# Patient Record
Sex: FEMALE | Race: WHITE | HISPANIC OR LATINO | Employment: FULL TIME | URBAN - METROPOLITAN AREA
[De-identification: names, ages, dates, MRNs, and addresses within clinical notes are randomized per-mention and may not be internally consistent; named-entity substitution may affect disease eponyms.]

---

## 2021-06-27 ENCOUNTER — APPOINTMENT (OUTPATIENT)
Dept: RADIOLOGY | Facility: MEDICAL CENTER | Age: 26
End: 2021-06-27
Attending: EMERGENCY MEDICINE
Payer: COMMERCIAL

## 2021-06-27 ENCOUNTER — NON-PROVIDER VISIT (OUTPATIENT)
Dept: OCCUPATIONAL MEDICINE | Facility: CLINIC | Age: 26
End: 2021-06-27

## 2021-06-27 ENCOUNTER — HOSPITAL ENCOUNTER (EMERGENCY)
Facility: MEDICAL CENTER | Age: 26
End: 2021-06-27
Attending: EMERGENCY MEDICINE
Payer: COMMERCIAL

## 2021-06-27 VITALS
RESPIRATION RATE: 18 BRPM | WEIGHT: 220 LBS | OXYGEN SATURATION: 95 % | HEIGHT: 64 IN | DIASTOLIC BLOOD PRESSURE: 83 MMHG | HEART RATE: 86 BPM | SYSTOLIC BLOOD PRESSURE: 115 MMHG | TEMPERATURE: 97.9 F | BODY MASS INDEX: 37.56 KG/M2

## 2021-06-27 DIAGNOSIS — Z02.83 ENCOUNTER FOR DRUG SCREENING: ICD-10-CM

## 2021-06-27 DIAGNOSIS — S86.912A KNEE STRAIN, LEFT, INITIAL ENCOUNTER: ICD-10-CM

## 2021-06-27 DIAGNOSIS — S56.912A FOREARM STRAIN, LEFT, INITIAL ENCOUNTER: ICD-10-CM

## 2021-06-27 DIAGNOSIS — S53.402A SPRAIN OF LEFT ELBOW, INITIAL ENCOUNTER: ICD-10-CM

## 2021-06-27 LAB
AMP AMPHETAMINE: NORMAL
BAR BARBITURATES: NORMAL
BREATH ALCOHOL COMMENT: NORMAL
BZO BENZODIAZEPINES: NORMAL
COC COCAINE: NORMAL
INT CON NEG: NORMAL
INT CON POS: NORMAL
MDMA ECSTASY: NORMAL
MET METHAMPHETAMINES: NORMAL
MTD METHADONE: NORMAL
OPI OPIATES: NORMAL
OXY OXYCODONE: NORMAL
PCP PHENCYCLIDINE: NORMAL
POC BREATHALIZER: 0 PERCENT (ref 0–0.01)
POC URINE DRUG SCREEN OCDRS: NORMAL
THC: NORMAL

## 2021-06-27 PROCEDURE — 8899 PR URINE 11 PANEL - AFTER HOURS: Performed by: PREVENTIVE MEDICINE

## 2021-06-27 PROCEDURE — 73090 X-RAY EXAM OF FOREARM: CPT | Mod: LT

## 2021-06-27 PROCEDURE — 73564 X-RAY EXAM KNEE 4 OR MORE: CPT | Mod: LT

## 2021-06-27 PROCEDURE — 73080 X-RAY EXAM OF ELBOW: CPT | Mod: LT

## 2021-06-27 PROCEDURE — 99283 EMERGENCY DEPT VISIT LOW MDM: CPT

## 2021-06-27 PROCEDURE — 82075 ASSAY OF BREATH ETHANOL: CPT | Performed by: PREVENTIVE MEDICINE

## 2021-06-27 PROCEDURE — 80305 DRUG TEST PRSMV DIR OPT OBS: CPT | Performed by: PREVENTIVE MEDICINE

## 2021-06-27 ASSESSMENT — LIFESTYLE VARIABLES
HAVE PEOPLE ANNOYED YOU BY CRITICIZING YOUR DRINKING: NO
TOTAL SCORE: 0
EVER HAD A DRINK FIRST THING IN THE MORNING TO STEADY YOUR NERVES TO GET RID OF A HANGOVER: NO
TOTAL SCORE: 0
HAVE YOU EVER FELT YOU SHOULD CUT DOWN ON YOUR DRINKING: NO
TOTAL SCORE: 0
EVER FELT BAD OR GUILTY ABOUT YOUR DRINKING: NO
CONSUMPTION TOTAL: INCOMPLETE
DO YOU DRINK ALCOHOL: YES

## 2021-06-27 ASSESSMENT — ENCOUNTER SYMPTOMS
FALLS: 1
VOMITING: 0
FEVER: 0
HEADACHES: 0

## 2021-06-27 ASSESSMENT — PAIN DESCRIPTION - PAIN TYPE: TYPE: ACUTE PAIN
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
## 2021-06-29 ENCOUNTER — OCCUPATIONAL MEDICINE (OUTPATIENT)
Dept: URGENT CARE | Facility: CLINIC | Age: 26
End: 2021-06-29
Payer: COMMERCIAL

## 2021-06-29 VITALS
WEIGHT: 205 LBS | SYSTOLIC BLOOD PRESSURE: 128 MMHG | RESPIRATION RATE: 16 BRPM | BODY MASS INDEX: 35 KG/M2 | OXYGEN SATURATION: 98 % | TEMPERATURE: 98.4 F | HEART RATE: 82 BPM | DIASTOLIC BLOOD PRESSURE: 76 MMHG | HEIGHT: 64 IN

## 2021-06-29 DIAGNOSIS — S56.912A STRAIN OF LEFT FOREARM, INITIAL ENCOUNTER: ICD-10-CM

## 2021-06-29 DIAGNOSIS — S80.02XA CONTUSION OF LEFT KNEE, INITIAL ENCOUNTER: ICD-10-CM

## 2021-06-29 DIAGNOSIS — S53.402A SPRAIN OF LEFT ELBOW, INITIAL ENCOUNTER: ICD-10-CM

## 2021-06-29 PROCEDURE — 99204 OFFICE O/P NEW MOD 45 MIN: CPT | Performed by: PHYSICIAN ASSISTANT

## 2021-06-29 ASSESSMENT — ENCOUNTER SYMPTOMS
FOCAL WEAKNESS: 0
SENSORY CHANGE: 0
TINGLING: 1
FALLS: 1
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
## 2021-06-29 NOTE — PATIENT INSTRUCTIONS
Muscle Strain  A muscle strain is an injury that happens when a muscle is stretched longer than normal. This can happen during a fall, sports, or lifting. This can tear some muscle fibers. Usually, recovery from muscle strain takes 1-2 weeks. Complete healing normally takes 5-6 weeks.  This condition is first treated with PRICE therapy. This involves:  · Protecting your muscle from being injured again.  · Resting your injured muscle.  · Icing your injured muscle.  · Applying pressure (compression) to your injured muscle. This may be done with a splint or elastic bandage.  · Raising (elevating) your injured muscle.  Your doctor may also recommend medicine for pain.  Follow these instructions at home:  If you have a splint:  · Wear the splint as told by your doctor. Take it off only as told by your doctor.  · Loosen the splint if your fingers or toes tingle, get numb, or turn cold and blue.  · Keep the splint clean.  · If the splint is not waterproof:  ? Do not let it get wet.  ? Cover it with a watertight covering when you take a bath or a shower.  Managing pain, stiffness, and swelling    · If directed, put ice on your injured area.  ? If you have a removable splint, take it off as told by your doctor.  ? Put ice in a plastic bag.  ? Place a towel between your skin and the bag.  ? Leave the ice on for 20 minutes, 2-3 times a day.  · Move your fingers or toes often. This helps to avoid stiffness and lessen swelling.  · Raise your injured area above the level of your heart while you are sitting or lying down.  · Wear an elastic bandage as told by your doctor. Make sure it is not too tight.  General instructions  · Take over-the-counter and prescription medicines only as told by your doctor.  · Limit your activity. Rest your injured muscle as told by your doctor. Your doctor may say that gentle movements are okay.  · If physical therapy was prescribed, do exercises as told by your doctor.  · Do not put pressure on any  part of the splint until it is fully hardened. This may take many hours.  · Do not use any products that contain nicotine or tobacco, such as cigarettes and e-cigarettes. These can delay bone healing. If you need help quitting, ask your doctor.  · Warm up before you exercise. This helps to prevent more muscle strains.  · Ask your doctor when it is safe to drive if you have a splint.  · Keep all follow-up visits as told by your doctor. This is important.  Contact a doctor if:  · You have more pain or swelling in your injured area.  Get help right away if:  · You have any of these problems in your injured area:  ? You have numbness.  ? You have tingling.  ? You lose a lot of strength.  Summary  · A muscle strain is an injury that happens when a muscle is stretched longer than normal.  · This condition is first treated with PRICE therapy. This includes protecting, resting, icing, adding pressure, and raising your injury.  · Limit your activity. Rest your injured muscle as told by your doctor. Your doctor may say that gentle movements are okay.  · Warm up before you exercise. This helps to prevent more muscle strains.  This information is not intended to replace advice given to you by your health care provider. Make sure you discuss any questions you have with your health care provider.  Document Released: 09/26/2009 Document Revised: 02/13/2020 Document Reviewed: 01/24/2018  Elsevier Patient Education © 2020 Else[employer] Inc[employer]  Contusion  A contusion is a deep bruise. This is a result of an injury that causes bleeding under the skin. Symptoms of bruising include pain, swelling, and discolored skin. The skin may turn blue, purple, or yellow.  Follow these instructions at home:  Managing pain, stiffness, and swelling  You may use RICE. This stands for:  · Resting.  · Icing.  · Compression, or putting pressure.  · Elevating, or raising the injured area.  To follow this method, do these actions:  · Rest the injured area.  · If  told, put ice on the injured area.  ? Put ice in a plastic bag.  ? Place a towel between your skin and the bag.  ? Leave the ice on for 20 minutes, 2-3 times per day.  · If told, put light pressure (compression) on the injured area using an elastic bandage. Make sure the bandage is not too tight. If the area tingles or becomes numb, remove it and put it back on as told by your doctor.  · If possible, raise (elevate) the injured area above the level of your heart while you are sitting or lying down.    General instructions  · Take over-the-counter and prescription medicines only as told by your doctor.  · Keep all follow-up visits as told by your doctor. This is important.  Contact a doctor if:  · Your symptoms do not get better after several days of treatment.  · Your symptoms get worse.  · You have trouble moving the injured area.  Get help right away if:  · You have very bad pain.  · You have a loss of feeling (numbness) in a hand or foot.  · Your hand or foot turns pale or cold.  Summary  · A contusion is a deep bruise. This is a result of an injury that causes bleeding under the skin.  · Symptoms of bruising include pain, swelling, and discolored skin. The skin may turn blue, purple, or yellow.  · This condition is treated with rest, ice, compression, and elevation. This is also called RICE. You may be given over-the-counter medicines for pain.  · Contact a doctor if you do not feel better, or you feel worse. Get help right away if you have very bad pain, have lost feeling in a hand or foot, or the area turns pale or cold.  This information is not intended to replace advice given to you by your health care provider. Make sure you discuss any questions you have with your health care provider.  Document Released: 06/05/2009 Document Revised: 08/09/2019 Document Reviewed: 08/09/2019  Elsevier Patient Education © 2020 Elsevier Inc.

## 2021-06-29 NOTE — PROGRESS NOTES
"Subjective:     Ambar Schulte  is a 26 y.o. female who presents for Arm Injury (Lt arm injury ), Elbow Injury (LT elbow injury ), and Knee Pain (LT knee injury)  Patient identity confirmed using two patient identifiers of last name and date of birth.    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 She had immediate pain in the left elbow, left forearm and left knee. She was seen in the ED at [facility]. Xrays of left elbow, left forearm and left knee were negative for fracture. She was placed in a sling and instructed to use tylenol or ibuprofen OTC for pain PRN which she has been using and is somewhat helpful.. She reports pain is slightly improved but still present. Denies neck pain. Denies numbness. Does admit to some \"tingling\" along the left radial wrist region when moving her thumb at times. Patient is right hand dominant.   Arm Injury    Knee Pain          Review of Systems   Musculoskeletal: Positive for falls and joint pain.   Neurological: Positive for tingling. Negative for sensory change and focal weakness.   All other systems reviewed and are negative.    Allergies   Allergen Reactions   • Pcn [Penicillins]    • Sulfa Drugs      Past medical history, family history, surgical history and social history are reviewed and updated in the record today.     Objective:   /76   Pulse 82   Temp 36.9 °C (98.4 °F) (Temporal)   Resp 16   Ht 1.626 m (5' 4\")   Wt 93 kg (205 lb)   SpO2 98%   BMI 35.19 kg/m²   Physical Exam  Vitals reviewed.   Constitutional:       Appearance: She is well-developed.   HENT:      Head: Normocephalic and atraumatic.      Right Ear: External ear normal.      Left Ear: External ear normal.   Eyes:      Extraocular Movements: Extraocular " movements intact.      Conjunctiva/sclera: Conjunctivae normal.      Pupils: Pupils are equal, round, and reactive to light.   Cardiovascular:      Rate and Rhythm: Normal rate and regular rhythm.      Pulses:           Radial pulses are 2+ on the left side.        Dorsalis pedis pulses are 2+ on the left side.   Pulmonary:      Effort: Pulmonary effort is normal.   Musculoskeletal:      Left elbow: Normal range of motion. Tenderness present.      Left forearm: Tenderness present.        Arms:       Cervical back: Normal range of motion and neck supple.      Left knee: Ecchymosis present. No swelling or deformity. Normal range of motion. Tenderness present.        Legs:    Lymphadenopathy:      Cervical: No cervical adenopathy.   Skin:     General: Skin is warm and dry.      Findings: No rash.   Neurological:      Mental Status: She is alert and oriented to person, place, and time.      Cranial Nerves: Cranial nerves are intact.      Sensory: Sensation is intact.      Motor: Motor function is intact.      Coordination: Coordination is intact.   Psychiatric:         Attention and Perception: Attention normal.         Mood and Affect: Mood normal.         Speech: Speech normal.         Behavior: Behavior normal.         Thought Content: Thought content normal.         Judgment: Judgment normal.       C-spine: no tenderness, full ROM  Left shoulder: mild tender to palpation. No limited ROM  Left elbow: no ecchymosis, soft tissue swelling or deformity noted. TTP along medial epicondyle. Mild pain medial with supination  Left forearm: no ecchymosis, soft tissue swelling or deformity noted. TTP along radial aspect. Pain along radial aspect with pronation, supination and flexion/extension of the wrist.   Left wrist: no soft tissue swelling, ecchymosis or deformity noted. Nontender distal radius or ulna. Pain in the forearm with flexion/extension as noted above.   Left hand: WNL  Distal NV intact  Left knee: ecchymosis over  the patella. TTP. Full ROM without limitation. No ligamentous laxity noted. Negative. Lachman, Negative Alisha  Assessment/Plan:   1. Sprain of left elbow, initial encounter    2. Strain of left forearm, initial encounter    3. Contusion of left knee, initial encounter  May continue sling PRN. Encouraged to come out of sling several times per day and work on elbow ROM to avoid joint stiffness, come out as sling as soon as able. May use Tylenol or ibuprofen over-the-counter as needed for pain or fever not to exceed recommended daily dose.  Work restrictions as noted on NV D-39 RTC : 1 week for work comp f/u and re-evaluation.     NV D-39 and C-4 completed.     Previous xray of left elbow, left forearm and left knee from DOS 6/27/21 are reviewed by myself as part of today's visit. Previous ED visit noted from same DOS reviewed by myself today.       Differential diagnosis, natural history, supportive care, and indications for immediate follow-up discussed.  Red flag warning symptoms and strict ER/follow-up precautions given.  The patient demonstrated a good understanding and agreed with the treatment plan.    Upon entering exam room I ensured patient was wearing a mask.  This provider wore appropriate PPE throughout entire visit.  Patient wore mask entire visit except for a brief period while examining oropharynx.    Please note that this note was created using voice recognition speech to text software. Every effort has been made to correct obvious errors.  However, I expect there are errors of grammar and possibly context that were not discovered prior to finalizing the note  INDER Moran PA-C

## 2021-07-06 ENCOUNTER — OCCUPATIONAL MEDICINE (OUTPATIENT)
Dept: URGENT CARE | Facility: CLINIC | Age: 26
End: 2021-07-06
Payer: COMMERCIAL

## 2021-07-06 VITALS
WEIGHT: 205 LBS | BODY MASS INDEX: 35 KG/M2 | OXYGEN SATURATION: 97 % | HEIGHT: 64 IN | TEMPERATURE: 97.4 F | RESPIRATION RATE: 14 BRPM | DIASTOLIC BLOOD PRESSURE: 88 MMHG | SYSTOLIC BLOOD PRESSURE: 110 MMHG | HEART RATE: 64 BPM

## 2021-07-06 DIAGNOSIS — S56.912D STRAIN OF LEFT FOREARM, SUBSEQUENT ENCOUNTER: ICD-10-CM

## 2021-07-06 DIAGNOSIS — S53.402D SPRAIN OF LEFT ELBOW, SUBSEQUENT ENCOUNTER: ICD-10-CM

## 2021-07-06 DIAGNOSIS — S80.02XD CONTUSION OF LEFT KNEE, SUBSEQUENT ENCOUNTER: ICD-10-CM

## 2021-07-06 PROCEDURE — 99213 OFFICE O/P EST LOW 20 MIN: CPT | Performed by: NURSE PRACTITIONER

## 2021-07-06 NOTE — LETTER
[facility]  [address], [address] - [place] [address]  Phone:  [phone] - Fax:  [phone]   [facility] Progress Report and Disability Certification  Date of Service: 7/6/2021   No Show:  No  Date / Time of Next Visit:     Claim Information   Patient Name: Ambar Schulte  Claim Number:     Employer: [employer]  Date of Injury: 6/27/2021     Insurer / TPA: [employer]  ID / SSN:     Occupation: Security  Diagnosis: Diagnoses of Sprain of left elbow, subsequent encounter, Strain of left forearm, subsequent encounter, and Contusion of left knee, subsequent encounter were pertinent to this visit.    Medical Information   Related to Industrial Injury? Yes    Subjective Complaints:  DOI 6/27/21: Patient presents to urgent care for a work comp follow-up.  Patient sustained injuries to her left knee and right arm.  She was initially seen in the emergency department, x-rays were performed.  Diagnosed with an elbow contusion, forearm strain, and knee contusion. She was placed in a sling, using as needed.  She has also been taking Tylenol and ibuprofen as needed.  She has been on work restrictions, tolerated well.  Today she reports improvement. She is no longer using a sling, her forearm and elbow are without pain. She has some slight pain to her left knee, mostly with palpation. She has been tolerating work restrictions.    Objective Findings: A/Ox4. NAD.  No midline spinal tenderness.  Left elbow: Normal in appearance, no tenderness throughout, full range of motion, strength 5/5.  Left forearm: Normal in appearance, nontender throughout, full range of motion, strength 5/5, neurovascular is intact.  Left wrist and shoulder normal appearance, full range of motion, are nontender.  Left knee: Mild swelling with ecchymosis to patella, associated tenderness present, knee has full range of motion, neurovascular is intact, no obvious laxity, strength  5/5.  Gait is stable.   Pre-Existing Condition(s): Denies   Assessment:   Condition Improved    Status: Additional Care Required  Permanent Disability:No    Plan: Medication  Comments:OTC motrin, RICE, trial full duty, RTC in 4 days.     Diagnostics: X-ray  Comments:Negative    Comments:       Disability Information   Status: Released to Full Duty    From:     Through:   Restrictions are:     Physical Restrictions   Sitting:    Standing:    Stooping:    Bending:      Squatting:    Walking:    Climbing:    Pushing:      Pulling:    Other:    Reaching Above Shoulder (L):   Reaching Above Shoulder (R):       Reaching Below Shoulder (L):    Reaching Below Shoulder (R):      Not to exceed Weight Limits   Carrying(hrs):   Weight Limit(lb):   Lifting(hrs):   Weight  Limit(lb):     Comments:      Repetitive Actions   Hands: i.e. Fine Manipulations from Grasping:     Feet: i.e. Operating Foot Controls:     Driving / Operate Machinery:     Provider Name:   ANDREI Kimble Physician Signature:  Physician Name:     Clinic Name / [facility] [place] [address]  [facility] [address] Clinic Phone Number: Dept: [phone]   Appointment Time: 8:00 Am Visit Start Time: 9:06 AM   Check-In Time:  9:04 Am Visit Discharge Time:  0932   Original-Treating Physician or Chiropractor    Page 2-Insurer/TPA    Page 3-Employer    Page 4-Employee

## 2021-07-06 NOTE — PROGRESS NOTES
Chief Complaint   Patient presents with   • Arm Injury     WC Fv, LT arm injury, Pt states she is improving.        HISTORY OF PRESENT ILLNESS: Patient is a pleasant 26 y.o. female who presents to urgent care today with a work comp follow up. DOI 6/27/21: Patient presents to urgent care for a work comp follow-up.  Patient sustained injuries to her left knee and right arm.  She was initially seen in the emergency department, x-rays were performed.  Diagnosed with an elbow contusion, forearm strain, and knee contusion. She was placed in a sling, using as needed.  She has also been taking Tylenol and ibuprofen as needed.  She has been on work restrictions, tolerated well.  Today she reports improvement. She is no longer using a sling, her forearm and elbow are without pain. She has some slight pain to her left knee, mostly with palpation. She has been tolerating work restrictions.         PMH: No pertinent past medical history to this problem  MEDS: Medications were reviewed in Epic  ALLERGIES: Allergies were reviewed in Epic  FH: No pertinent family history to this problem      ROS:  Review of Systems   Constitutional: Negative for fever, chills, weight loss, malaise, and fatigue.   HENT: Negative for ear pain, nosebleeds, congestion, sore throat and neck pain.    Eyes: Negative for vision changes.   Neuro: Negative for headache, sensory changes, weakness, seizure, LOC.   Cardiovascular: Negative for chest pain, palpitations, orthopnea and leg swelling.   Respiratory: Negative for cough, sputum production, shortness of breath and wheezing.   Gastrointestinal: Negative for abdominal pain, nausea, vomiting or diarrhea.   Genitourinary: Negative for dysuria, urgency and frequency.  Musculoskeletal: Positive for left arm and leg injury. Negative for neck pain, back pain, myalgias.   Skin: Negative for rash, diaphoresis.     Exam:  /88   Pulse 64   Temp 36.3 °C (97.4 °F) (Temporal)   Resp 14   Ht 1.626 m (5'  "4\")   Wt 93 kg (205 lb)   SpO2 97%   General: well-nourished, well-developed female in NAD  Head: normocephalic, atraumatic  Eyes: PERRLA, no conjunctival injection, acuity grossly intact, lids normal.  Ears: normal shape and symmetry, no tenderness, no discharge. External canals are without any significant edema or erythema. Tympanic membranes are without any inflammation, no effusion. Gross auditory acuity is intact.  Nose: symmetrical without tenderness, no discharge.  Mouth/Throat: reasonable hygiene, no erythema, exudates or tonsillar enlargement.  Neck: no masses, range of motion within normal limits, no tracheal deviation. No obvious thyroid enlargement.   Lymph: no cervical adenopathy. No supraclavicular adenopathy.   Neuro: alert and oriented. Cranial nerves 1-12 grossly intact. No sensory deficit.   Cardiovascular: regular rate and rhythm. No edema.  Pulmonary: no distress. Chest is symmetrical with respiration, no wheezes, crackles, or rhonchi.   Musculoskeletal: no clubbing, appropriate muscle tone.  No midline spinal tenderness.  Left elbow: Normal in appearance, no tenderness throughout, full range of motion, strength 5/5.  Left forearm: Normal in appearance, nontender throughout, full range of motion, strength 5/5, neurovascular is intact.  Left wrist and shoulder normal appearance, full range of motion, are nontender.  Left knee: Mild swelling with ecchymosis to patella, associated tenderness present, knee has full range of motion, neurovascular is intact, no obvious laxity, strength 5/5.  Gait is stable.  Skin: warm, dry, intact, no clubbing, no cyanosis, no rashes.   Psych: appropriate mood, affect, judgement.         Assessment/Plan:  1. Sprain of left elbow, subsequent encounter     2. Strain of left forearm, subsequent encounter     3. Contusion of left knee, subsequent encounter         OTC motrin, RICE, trial full duty, RTC in 4 days for anticipated MMI.   Supportive care, differential " diagnoses, and indications for immediate follow-up discussed with patient.   Pathogenesis of diagnosis discussed including typical length and natural progression.   Instructed to return to clinic or nearest emergency department sooner for any change in condition, further concerns, or worsening of symptoms.  Patient states understanding of the plan of care and discharge instructions.          Please note that this dictation was created using voice recognition software. I have made every reasonable attempt to correct obvious errors, but I expect that there are errors of grammar and possibly content that I did not discover before finalizing the note.      ELEUTERIO Kimble.

## 2021-07-10 ENCOUNTER — OCCUPATIONAL MEDICINE (OUTPATIENT)
Dept: URGENT CARE | Facility: PHYSICIAN GROUP | Age: 26
End: 2021-07-10
Payer: COMMERCIAL

## 2021-07-10 VITALS
HEIGHT: 64 IN | DIASTOLIC BLOOD PRESSURE: 60 MMHG | TEMPERATURE: 98.1 F | WEIGHT: 205 LBS | SYSTOLIC BLOOD PRESSURE: 110 MMHG | HEART RATE: 71 BPM | BODY MASS INDEX: 35 KG/M2 | RESPIRATION RATE: 16 BRPM | OXYGEN SATURATION: 98 %

## 2021-07-10 DIAGNOSIS — S53.402D SPRAIN OF LEFT ELBOW, SUBSEQUENT ENCOUNTER: ICD-10-CM

## 2021-07-10 DIAGNOSIS — S80.02XD CONTUSION OF LEFT KNEE, SUBSEQUENT ENCOUNTER: ICD-10-CM

## 2021-07-10 DIAGNOSIS — S56.912D STRAIN OF LEFT FOREARM, SUBSEQUENT ENCOUNTER: ICD-10-CM

## 2021-07-10 PROCEDURE — 99213 OFFICE O/P EST LOW 20 MIN: CPT | Performed by: NURSE PRACTITIONER

## 2021-07-10 ASSESSMENT — ENCOUNTER SYMPTOMS
SENSORY CHANGE: 0
FOCAL WEAKNESS: 0
TINGLING: 0
FEVER: 0
CHILLS: 0

## 2021-07-10 NOTE — PROGRESS NOTES
Subjective:      Ambar Schulte is a 26 y.o. female who presents with Follow-Up (DOI 06/27/21 injury on left arm and left knee, pt states feels better today )      DOI: 6/27/21. 26 year old female here for follow up on contusion to L. Elbow, left knee and strain of left forearm. Today she reports improvement in all aspects. Tolerating full duty. She states when lifting heavy object she will have some soreness but overall much improved.     HPI  Pcn [penicillins] and Sulfa drugs  No current outpatient medications on file prior to visit.     No current facility-administered medications on file prior to visit.     Social History     Socioeconomic History   • Marital status: Single     Spouse name: Not on file   • Number of children: Not on file   • Years of education: Not on file   • Highest education level: Not on file   Occupational History   • Not on file   Tobacco Use   • Smoking status: Never Smoker   • Smokeless tobacco: Never Used   Vaping Use   • Vaping Use: Never used   Substance and Sexual Activity   • Alcohol use: Not Currently   • Drug use: Not Currently   • Sexual activity: Not on file   Other Topics Concern   • Not on file   Social History Narrative   • Not on file     Social Determinants of Health     Financial Resource Strain:    • Difficulty of Paying Living Expenses:    Food Insecurity:    • Worried About Running Out of Food in the Last Year:    • Ran Out of Food in the Last Year:    Transportation Needs:    • Lack of Transportation (Medical):    • Lack of Transportation (Non-Medical):    Physical Activity:    • Days of Exercise per Week:    • Minutes of Exercise per Session:    Stress:    • Feeling of Stress :    Social Connections:    • Frequency of Communication with Friends and Family:    • Frequency of Social Gatherings with Friends and Family:    • Attends [employer] Services:    • Active Member of Clubs or Organizations:    • Attends Club or Organization Meetings:    • Marital Status:   "  Intimate Partner Violence:    • Fear of Current or Ex-Partner:    • Emotionally Abused:    • Physically Abused:    • Sexually Abused:      Breast Cancer-related family history is not on file.    Review of Systems   Constitutional: Negative for chills and fever.   Musculoskeletal: Positive for joint pain.   Neurological: Negative for tingling, sensory change and focal weakness.          Objective:     /60 (BP Location: Right arm, Patient Position: Sitting, BP Cuff Size: Adult)   Pulse 71   Temp 36.7 °C (98.1 °F) (Temporal)   Resp 16   Ht 1.626 m (5' 4\")   Wt 93 kg (205 lb)   SpO2 98%   BMI 35.19 kg/m²      Physical Exam  Constitutional:       Appearance: Normal appearance. She is not ill-appearing.   Cardiovascular:      Rate and Rhythm: Normal rate and regular rhythm.      Heart sounds: No murmur heard.     Pulmonary:      Effort: Pulmonary effort is normal.      Breath sounds: Normal breath sounds.   Musculoskeletal:         General: Normal range of motion.      Left elbow: No swelling or deformity. Normal range of motion. No tenderness.      Left forearm: No swelling, tenderness or bony tenderness.      Comments: Tenderness to patella with mild bruising.   Skin:     General: Skin is warm and dry.   Neurological:      General: No focal deficit present.      Mental Status: She is alert and oriented to person, place, and time.   Psychiatric:         Mood and Affect: Mood normal.         Physical Exam  Constitutional:       Appearance: Normal appearance. She is not ill-appearing.   Cardiovascular:      Rate and Rhythm: Normal rate and regular rhythm.      Heart sounds: No murmur heard.     Pulmonary:      Effort: Pulmonary effort is normal.      Breath sounds: Normal breath sounds.   Musculoskeletal:         General: Normal range of motion.      Left elbow: No swelling or deformity. Normal range of motion. No tenderness.      Left forearm: No swelling, tenderness or bony tenderness.      Comments: " Tenderness to patella with mild bruising.   Skin:     General: Skin is warm and dry.   Neurological:      General: No focal deficit present.      Mental Status: She is alert and oriented to person, place, and time.   Psychiatric:         Mood and Affect: Mood normal.                   Assessment/Plan:        1. Strain of left forearm, subsequent encounter     2. Sprain of left elbow, subsequent encounter     3. Contusion of left knee, subsequent encounter       MMI.  Follow up as needed.

## 2021-07-10 NOTE — LETTER
[facility]  [address] - [place] [address]  Phone:  [phone] - Fax:  [phone]   [facility] Progress Report and Disability Certification  Date of Service: 7/10/2021   No Show:  No  Date / Time of Next Visit:     Claim Information   Patient Name: Ambar Schulte  Claim Number:     Employer: [employer]  Date of Injury: 6/27/2021     Insurer / TPA: [employer]  ID / SSN:     Occupation: Security  Diagnosis: Diagnoses of Strain of left forearm, subsequent encounter, Sprain of left elbow, subsequent encounter, and Contusion of left knee, subsequent encounter were pertinent to this visit.    Medical Information   Related to Industrial Injury? Yes    Subjective Complaints:  DOI: 6/27/21. 26 year old female here for follow up on contusion to L. Elbow, left knee and strain of left forearm. Today she reports improvement in all aspects. Tolerating full duty. She states when lifting heavy object she will have some soreness but overall much improved.   Objective Findings: Physical Exam  Constitutional:       Appearance: Normal appearance. She is not ill-appearing.   Cardiovascular:      Rate and Rhythm: Normal rate and regular rhythm.      Heart sounds: No murmur heard.     Pulmonary:      Effort: Pulmonary effort is normal.      Breath sounds: Normal breath sounds.   Musculoskeletal:         General: Normal range of motion.      Left elbow: No swelling or deformity. Normal range of motion. No tenderness.      Left forearm: No swelling, tenderness or bony tenderness.      Comments: Tenderness to patella with mild bruising.   Skin:     General: Skin is warm and dry.   Neurological:      General: No focal deficit present.      Mental Status: She is alert and oriented to person, place, and time.   Psychiatric:         Mood and Affect: Mood normal.        Pre-Existing Condition(s):     Assessment:   Condition Improved    Status:  Discharged /  MMI  Permanent Disability:No    Plan:      Diagnostics:      Comments:       Disability Information   Status: Released to Full Duty    From:     Through:   Restrictions are:     Physical Restrictions   Sitting:    Standing:    Stooping:    Bending:      Squatting:    Walking:    Climbing:    Pushing:      Pulling:    Other:    Reaching Above Shoulder (L):   Reaching Above Shoulder (R):       Reaching Below Shoulder (L):    Reaching Below Shoulder (R):      Not to exceed Weight Limits   Carrying(hrs):   Weight Limit(lb):   Lifting(hrs):   Weight  Limit(lb):     Comments:      Repetitive Actions   Hands: i.e. Fine Manipulations from Grasping:     Feet: i.e. Operating Foot Controls:     Driving / Operate Machinery:     Provider Name:   ERIN Coronel Physician Signature:  Physician Name:     Clinic Name / Location: [address]  [place] [address] Clinic Phone Number: Dept: [phone]   Appointment Time: 9:00 Am Visit Start Time: 9:09 AM   Check-In Time:  9:03 Am Visit Discharge Time:  9:21AM   Original-Treating Physician or Chiropractor    Page 2-Insurer/TPA    Page 3-Employer    Page 4-Employee

## 2021-08-13 ENCOUNTER — NON-PROVIDER VISIT (OUTPATIENT)
Dept: OCCUPATIONAL MEDICINE | Facility: CLINIC | Age: 26
End: 2021-08-13

## 2021-08-13 DIAGNOSIS — Z02.1 PRE-EMPLOYMENT HEALTH SCREENING EXAMINATION: ICD-10-CM

## 2021-08-13 DIAGNOSIS — Z02.1 PRE-EMPLOYMENT DRUG SCREENING: ICD-10-CM

## 2021-08-13 LAB
AMP AMPHETAMINE: NORMAL
COC COCAINE: NORMAL
INT CON NEG: NORMAL
INT CON POS: NORMAL
MET METHAMPHETAMINES: NORMAL
OPI OPIATES: NORMAL
PCP PHENCYCLIDINE: NORMAL
POC DRUG COMMENT 753798-OCCUPATIONAL HEALTH: NEGATIVE
THC: NORMAL

## 2021-08-13 PROCEDURE — 80305 DRUG TEST PRSMV DIR OPT OBS: CPT | Performed by: NURSE PRACTITIONER

## 2023-12-10 ENCOUNTER — EH NON-PROVIDER (OUTPATIENT)
Dept: OCCUPATIONAL MEDICINE | Facility: CLINIC | Age: 28
End: 2023-12-10
Payer: COMMERCIAL

## 2023-12-10 ENCOUNTER — APPOINTMENT (OUTPATIENT)
Dept: RADIOLOGY | Facility: MEDICAL CENTER | Age: 28
End: 2023-12-10
Attending: EMERGENCY MEDICINE
Payer: COMMERCIAL

## 2023-12-10 ENCOUNTER — HOSPITAL ENCOUNTER (EMERGENCY)
Facility: MEDICAL CENTER | Age: 28
End: 2023-12-10
Attending: EMERGENCY MEDICINE
Payer: COMMERCIAL

## 2023-12-10 VITALS
BODY MASS INDEX: 36.55 KG/M2 | HEIGHT: 64 IN | OXYGEN SATURATION: 98 % | WEIGHT: 214.07 LBS | HEART RATE: 88 BPM | RESPIRATION RATE: 18 BRPM | DIASTOLIC BLOOD PRESSURE: 70 MMHG | SYSTOLIC BLOOD PRESSURE: 118 MMHG | TEMPERATURE: 98.2 F

## 2023-12-10 DIAGNOSIS — Z02.83 ENCOUNTER FOR DRUG SCREENING: ICD-10-CM

## 2023-12-10 DIAGNOSIS — S06.0X0A CONCUSSION WITHOUT LOSS OF CONSCIOUSNESS, INITIAL ENCOUNTER: ICD-10-CM

## 2023-12-10 DIAGNOSIS — S09.90XA CLOSED HEAD INJURY, INITIAL ENCOUNTER: ICD-10-CM

## 2023-12-10 PROCEDURE — 80305 DRUG TEST PRSMV DIR OPT OBS: CPT | Performed by: NURSE PRACTITIONER

## 2023-12-10 PROCEDURE — 99283 EMERGENCY DEPT VISIT LOW MDM: CPT

## 2023-12-10 PROCEDURE — 70450 CT HEAD/BRAIN W/O DYE: CPT

## 2023-12-10 PROCEDURE — A9270 NON-COVERED ITEM OR SERVICE: HCPCS | Performed by: EMERGENCY MEDICINE

## 2023-12-10 PROCEDURE — 99026 IN-HOSPITAL ON CALL SERVICE: CPT | Performed by: NURSE PRACTITIONER

## 2023-12-10 PROCEDURE — 82075 ASSAY OF BREATH ETHANOL: CPT | Performed by: NURSE PRACTITIONER

## 2023-12-10 PROCEDURE — 700102 HCHG RX REV CODE 250 W/ 637 OVERRIDE(OP): Performed by: EMERGENCY MEDICINE

## 2023-12-10 RX ORDER — ACETAMINOPHEN 325 MG/1
650 TABLET ORAL ONCE
Status: COMPLETED | OUTPATIENT
Start: 2023-12-10 | End: 2023-12-10

## 2023-12-10 RX ORDER — IBUPROFEN 600 MG/1
600 TABLET ORAL ONCE
Status: COMPLETED | OUTPATIENT
Start: 2023-12-10 | End: 2023-12-10

## 2023-12-10 RX ADMIN — IBUPROFEN 600 MG: 600 TABLET, FILM COATED ORAL at 06:15

## 2023-12-10 RX ADMIN — ACETAMINOPHEN 650 MG: 325 TABLET, FILM COATED ORAL at 06:15

## 2023-12-10 ASSESSMENT — PAIN DESCRIPTION - PAIN TYPE
TYPE: ACUTE PAIN
TYPE: ACUTE PAIN
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
## 2023-12-10 NOTE — ED NOTES
Pt discharged. GCS 15. IV discontinued and gauze placed, pt in possession of belongings. Pt provided discharge education and information pertaining to medications and follow up appointments. Pt received copy of discharge instructions and verbalized understanding.     Vitals:    12/10/23 0500   BP: 118/70   Pulse: 88   Resp: 18   Temp: 36.8 °C (98.2 °F)   SpO2: 98%
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
## 2023-12-10 NOTE — ED NOTES
Pt ambulated to blue 13 from [facility] with steady gait.  Call light in reach. Chart up for ERP.
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
## 2023-12-11 ENCOUNTER — HOSPITAL ENCOUNTER (EMERGENCY)
Facility: MEDICAL CENTER | Age: 28
End: 2023-12-11
Attending: EMERGENCY MEDICINE
Payer: COMMERCIAL

## 2023-12-11 VITALS
DIASTOLIC BLOOD PRESSURE: 61 MMHG | TEMPERATURE: 97.2 F | WEIGHT: 208.11 LBS | BODY MASS INDEX: 35.72 KG/M2 | RESPIRATION RATE: 16 BRPM | SYSTOLIC BLOOD PRESSURE: 102 MMHG | HEART RATE: 64 BPM | OXYGEN SATURATION: 96 %

## 2023-12-11 DIAGNOSIS — S06.0X0A CONCUSSION WITHOUT LOSS OF CONSCIOUSNESS, INITIAL ENCOUNTER: ICD-10-CM

## 2023-12-11 PROCEDURE — 96372 THER/PROPH/DIAG INJ SC/IM: CPT

## 2023-12-11 PROCEDURE — 99283 EMERGENCY DEPT VISIT LOW MDM: CPT

## 2023-12-11 PROCEDURE — 700111 HCHG RX REV CODE 636 W/ 250 OVERRIDE (IP): Performed by: EMERGENCY MEDICINE

## 2023-12-11 RX ORDER — ONDANSETRON 4 MG/1
4 TABLET, ORALLY DISINTEGRATING ORAL EVERY 6 HOURS PRN
Qty: 10 TABLET | Refills: 0 | Status: SHIPPED | OUTPATIENT
Start: 2023-12-11

## 2023-12-11 RX ORDER — ONDANSETRON 4 MG/1
4 TABLET, ORALLY DISINTEGRATING ORAL ONCE
Status: COMPLETED | OUTPATIENT
Start: 2023-12-11 | End: 2023-12-11

## 2023-12-11 RX ORDER — KETOROLAC TROMETHAMINE 30 MG/ML
30 INJECTION, SOLUTION INTRAMUSCULAR; INTRAVENOUS ONCE
Status: COMPLETED | OUTPATIENT
Start: 2023-12-11 | End: 2023-12-11

## 2023-12-11 RX ADMIN — KETOROLAC TROMETHAMINE 30 MG: 30 INJECTION, SOLUTION INTRAMUSCULAR; INTRAVENOUS at 13:33

## 2023-12-11 RX ADMIN — ONDANSETRON 4 MG: 4 TABLET, ORALLY DISINTEGRATING ORAL at 13:33

## 2023-12-11 ASSESSMENT — PAIN DESCRIPTION - PAIN TYPE
TYPE: ACUTE PAIN
TYPE: ACUTE PAIN

## 2023-12-11 NOTE — ED TRIAGE NOTES
.  Chief Complaint   Patient presents with    Other     Seen on 12/9 for concussion while at work. Unable to get appointment at [facility] until 12/19.   Reports feeling worse than yesterday    N/V     Onset today      Ambulated to triage with request to be seen as advised to follow up in 2 days unable to get appointment that soon.

## 2023-12-11 NOTE — LETTER
[facility], EMERGENCY DEPT   [address], [place] [address]  Phone: Dept: [phone] - Fax:        [facility] Progress Report and Disability Certification  Date of Service: 12/11/2023   No Show:  No  Date / Time of Next Visit:     Claim Information   Patient Name: Ambar Schulte  Claim Number:     Employer: [employer]  Date of Injury: 12/9/2023     Insurer / TPA:  [facility] ID / SSN:    Occupation:  Diagnosis: The encounter diagnosis was Concussion without loss of consciousness, initial encounter.    Medical Information   Related to Industrial Injury?      Subjective Complaints:  Headache blurred vision nausea   Objective Findings: Nonfocal normal neurological exam   Pre-Existing Condition(s): None   Assessment:   Initial Visit    Status:    Permanent Disability:     Plan: Transfer Care    Diagnostics:      Comments:  CT scan was performed yesterday    Disability Information   Status: Released to Restricted Duty    From:     Through:   Restrictions are:     Physical Restrictions   Sitting:    Standing:    Stooping:  Never Bending:      Squatting:    Walking:    Climbing:    Pushing:      Pulling:    Other:    Reaching Above Shoulder (L):   Reaching Above Shoulder (R):       Reaching Below Shoulder (L):    Reaching Below Shoulder (R):      Not to exceed Weight Limits   Carrying(hrs):   Weight Limit(lb):   Lifting(hrs):   Weight  Limit(lb):     Comments:      Repetitive Actions   Hands: i.e. Fine Manipulations from Grasping:     Feet: i.e. Operating Foot Controls:     Driving / Operate Machinery:     Physician Name: Tho Mattson Physician Signature: THO Arora M.D. e-Signature:  , Medical Director   Clinic Name / Location: [facility], EMERGENCY DEPT  [address] [address] [phone]     Clinic Phone Number: Dept: [phone]    Appointment Time:  Visit Start Time:    Check-In Time:  12:37 PM Visit Discharge Time:    Original-Treating Physician or Chiropractor    Page 2-Insurer/TPA    Page 3-Employer    Page 4-Employee
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
## 2023-12-14 ENCOUNTER — OFFICE VISIT (OUTPATIENT)
Dept: URGENT CARE | Facility: PHYSICIAN GROUP | Age: 28
End: 2023-12-14
Payer: COMMERCIAL

## 2023-12-14 VITALS
HEART RATE: 80 BPM | DIASTOLIC BLOOD PRESSURE: 68 MMHG | HEIGHT: 64 IN | RESPIRATION RATE: 16 BRPM | BODY MASS INDEX: 35.51 KG/M2 | WEIGHT: 208 LBS | SYSTOLIC BLOOD PRESSURE: 100 MMHG | TEMPERATURE: 97.9 F | OXYGEN SATURATION: 97 %

## 2023-12-14 DIAGNOSIS — R31.9 URINARY TRACT INFECTION WITH HEMATURIA, SITE UNSPECIFIED: ICD-10-CM

## 2023-12-14 DIAGNOSIS — R30.0 DYSURIA: ICD-10-CM

## 2023-12-14 DIAGNOSIS — N39.0 URINARY TRACT INFECTION WITH HEMATURIA, SITE UNSPECIFIED: ICD-10-CM

## 2023-12-14 LAB
APPEARANCE UR: CLEAR
BILIRUB UR STRIP-MCNC: NORMAL MG/DL
COLOR UR AUTO: YELLOW
GLUCOSE UR STRIP.AUTO-MCNC: NORMAL MG/DL
KETONES UR STRIP.AUTO-MCNC: NORMAL MG/DL
LEUKOCYTE ESTERASE UR QL STRIP.AUTO: NORMAL
NITRITE UR QL STRIP.AUTO: NORMAL
PH UR STRIP.AUTO: 6 [PH] (ref 5–8)
POCT INT CON NEG: NEGATIVE
POCT INT CON POS: POSITIVE
POCT URINE PREGNANCY TEST: NEGATIVE
PROT UR QL STRIP: NORMAL MG/DL
RBC UR QL AUTO: NORMAL
SP GR UR STRIP.AUTO: 1.03
UROBILINOGEN UR STRIP-MCNC: 0.2 MG/DL

## 2023-12-14 PROCEDURE — 3074F SYST BP LT 130 MM HG: CPT | Performed by: PHYSICIAN ASSISTANT

## 2023-12-14 PROCEDURE — 1125F AMNT PAIN NOTED PAIN PRSNT: CPT | Performed by: PHYSICIAN ASSISTANT

## 2023-12-14 PROCEDURE — 3078F DIAST BP <80 MM HG: CPT | Performed by: PHYSICIAN ASSISTANT

## 2023-12-14 PROCEDURE — 99213 OFFICE O/P EST LOW 20 MIN: CPT | Performed by: PHYSICIAN ASSISTANT

## 2023-12-14 PROCEDURE — 81025 URINE PREGNANCY TEST: CPT | Performed by: PHYSICIAN ASSISTANT

## 2023-12-14 PROCEDURE — 81002 URINALYSIS NONAUTO W/O SCOPE: CPT | Performed by: PHYSICIAN ASSISTANT

## 2023-12-14 RX ORDER — NITROFURANTOIN 25; 75 MG/1; MG/1
100 CAPSULE ORAL 2 TIMES DAILY
Qty: 10 CAPSULE | Refills: 0 | Status: SHIPPED | OUTPATIENT
Start: 2023-12-14 | End: 2023-12-19

## 2023-12-14 ASSESSMENT — ENCOUNTER SYMPTOMS
NAUSEA: 0
VOMITING: 0
ABDOMINAL PAIN: 1
FLANK PAIN: 0
EYE REDNESS: 0
FEVER: 0
EYE DISCHARGE: 0

## 2023-12-14 ASSESSMENT — PAIN SCALES - GENERAL: PAINLEVEL: 8=MODERATE-SEVERE PAIN

## 2023-12-14 NOTE — PROGRESS NOTES
"Marlin Schulte is a 28 y.o. female who presents with UTI (X 2 day pain with urination with lower pelvic pain )            UTI  This is a new problem. Episode onset: x 2 days ago. Associated symptoms include abdominal pain (The patient reports mild intermittent pelvic cramping.) and urinary symptoms (The patient reports associated dysuria and urinary frequency.  She has also noticed a small amount of blood in her urine, but states she is just finishing her menstrual period.  The patient reports no flank pain.). Pertinent negatives include no fever, nausea or vomiting. She has tried nothing for the symptoms.     The patient reports no abnormal vaginal discharge.  The patient states she did recently finished her menstrual period.      PMH:  has no past medical history on file.  MEDS:   Current Outpatient Medications:     ondansetron (ZOFRAN ODT) 4 MG TABLET DISPERSIBLE, Take 1 Tablet by mouth every 6 hours as needed for Nausea/Vomiting., Disp: 10 Tablet, Rfl: 0  ALLERGIES:   Allergies   Allergen Reactions    Pcn [Penicillins]     Sulfa Drugs      SURGHX: No past surgical history on file.  SOCHX:  reports that she has never smoked. She has never used smokeless tobacco. She reports that she does not currently use alcohol. She reports that she does not currently use drugs.  FH: Family history was reviewed, no pertinent findings to report    Review of Systems   Constitutional:  Negative for fever.   Eyes:  Negative for discharge and redness.   Gastrointestinal:  Positive for abdominal pain (The patient reports mild intermittent pelvic cramping.). Negative for nausea and vomiting.   Genitourinary:  Positive for dysuria, frequency and hematuria. Negative for flank pain.              Objective     /68 (BP Location: Right arm, Patient Position: Sitting, BP Cuff Size: Adult)   Pulse 80   Temp 36.6 °C (97.9 °F) (Temporal)   Resp 16   Ht 1.626 m (5' 4\")   Wt 94.3 kg (208 lb)   LMP 11/20/2023 " (Approximate)   SpO2 97%   BMI 35.70 kg/m²      Physical Exam  Constitutional:       General: She is not in acute distress.     Appearance: Normal appearance. She is well-developed. She is not ill-appearing.   HENT:      Head: Normocephalic and atraumatic.      Right Ear: External ear normal.      Left Ear: External ear normal.   Eyes:      Extraocular Movements: Extraocular movements intact.      Conjunctiva/sclera: Conjunctivae normal.   Cardiovascular:      Rate and Rhythm: Normal rate and regular rhythm.      Heart sounds: Normal heart sounds.   Pulmonary:      Effort: Pulmonary effort is normal. No respiratory distress.      Breath sounds: Normal breath sounds. No wheezing.   Abdominal:      Palpations: Abdomen is soft.      Tenderness: There is no abdominal tenderness. There is no right CVA tenderness or left CVA tenderness.   Musculoskeletal:         General: Normal range of motion.      Cervical back: Normal range of motion and neck supple.   Skin:     General: Skin is warm and dry.   Neurological:      Mental Status: She is alert and oriented to person, place, and time.               Progress:  Results for orders placed or performed in visit on 12/14/23   POCT Urinalysis   Result Value Ref Range    POC Color yellow Negative    POC Appearance clear Negative    POC Glucose neg Negative mg/dL    POC Bilirubin neg Negative mg/dL    POC Ketones neg Negative mg/dL    POC Specific Gravity 1.030 <1.005 - >1.030    POC Blood mod Negative    POC Urine PH 6.0 5.0 - 8.0    POC Protein neg Negative mg/dL    POC Urobiligen 0.2 Negative (0.2) mg/dL    POC Nitrites neg Negative    POC Leukocyte Esterase neg Negative   POCT Pregnancy   Result Value Ref Range    POC Urine Pregnancy Test Negative     Internal Control Positive Positive     Internal Control Negative Negative                 Assessment & Plan       1. Dysuria  - POCT Urinalysis  - POCT Pregnancy    2. Urinary tract infection with hematuria, site unspecified  -  nitrofurantoin (MACROBID) 100 MG Cap; Take 1 Capsule by mouth 2 times a day for 5 days.  Dispense: 10 Capsule; Refill: 0    The patient's presenting symptoms and physical exam findings are consistent with dysuria likely secondary to an acute urinary tract infection.  The patient physical exam today in clinic was normal.  No CVA tenderness was appreciated.  The patient is nontoxic and appears in no acute distress.  The patient vital signs are stable and within normal limits.  She is afebrile today in clinic.  The patient POCT urinalysis today in clinic showed moderate blood with negative leukocyte esterase, and negative nitrates.  The patient POCT pregnancy was negative.  Will prescribe patient Macrobid for presumed urinary tract infection.  Advised patient to monitor worsening signs or symptoms.  Recommend OTC medications and supportive care for symptomatic management.  Recommend patient follow-up with PCP as needed.  Discussed precautions with the patient, and she verbalized understanding.    Differential diagnoses, supportive care, and indications for immediate follow-up discussed with patient.   Instructed to return to clinic or nearest emergency department for any change in condition, further concerns, or worsening of symptoms.    OTC Tylenol or Motrin for fever/discomfort.  Drink plenty of fluids  Follow-up with PCP  Return to clinic or go to the ED if symptoms worsen or fail to improve, or if the patient should develop worsening/increasing urinary symptoms, hematuria, flank pain, abdominal pain, nausea/vomiting, fever/chills, and/or any concerning symptoms.    Discussed plan with the patient, and she agrees to the above.     I personally reviewed prior external notes and test results pertinent to today's visit.  I have independently reviewed and interpreted all diagnostics ordered during this urgent care visit.     Please note that this dictation was created using voice recognition software. I have made every  reasonable attempt to correct obvious errors, but I expect that there may be errors of grammar and possibly content that I did not discover before finalizing the note.       This note was electronically signed by Anabel Boyer PA-C

## 2023-12-18 LAB
AMP AMPHETAMINE: NORMAL
BREATH ALCOHOL COMMENT: NORMAL
COC COCAINE: NORMAL
INT CON NEG: NORMAL
INT CON POS: NORMAL
MET METHAMPHETAMINES: NORMAL
OPI OPIATES: NORMAL
PCP PHENCYCLIDINE: NORMAL
POC BREATHALIZER: 0 PERCENT (ref 0–0.01)
POC DRUG COMMENT 753798-OCCUPATIONAL HEALTH: NORMAL
THC: NORMAL

## 2024-02-14 ENCOUNTER — OFFICE VISIT (OUTPATIENT)
Dept: URGENT CARE | Facility: PHYSICIAN GROUP | Age: 29
End: 2024-02-14
Payer: COMMERCIAL

## 2024-02-14 VITALS
SYSTOLIC BLOOD PRESSURE: 112 MMHG | HEIGHT: 64 IN | RESPIRATION RATE: 20 BRPM | OXYGEN SATURATION: 95 % | TEMPERATURE: 99.9 F | BODY MASS INDEX: 34.83 KG/M2 | DIASTOLIC BLOOD PRESSURE: 64 MMHG | HEART RATE: 96 BPM | WEIGHT: 204 LBS

## 2024-02-14 DIAGNOSIS — J10.1 INFLUENZA B: ICD-10-CM

## 2024-02-14 DIAGNOSIS — R68.89 FLU-LIKE SYMPTOMS: ICD-10-CM

## 2024-02-14 LAB
FLUAV RNA SPEC QL NAA+PROBE: NEGATIVE
FLUBV RNA SPEC QL NAA+PROBE: POSITIVE
RSV RNA SPEC QL NAA+PROBE: NEGATIVE
SARS-COV-2 RNA RESP QL NAA+PROBE: NEGATIVE

## 2024-02-14 PROCEDURE — 3078F DIAST BP <80 MM HG: CPT | Performed by: FAMILY MEDICINE

## 2024-02-14 PROCEDURE — 3074F SYST BP LT 130 MM HG: CPT | Performed by: FAMILY MEDICINE

## 2024-02-14 PROCEDURE — 0241U POCT CEPHEID COV-2, FLU A/B, RSV - PCR: CPT | Performed by: FAMILY MEDICINE

## 2024-02-14 PROCEDURE — 99213 OFFICE O/P EST LOW 20 MIN: CPT | Performed by: FAMILY MEDICINE

## 2024-02-14 RX ORDER — OSELTAMIVIR PHOSPHATE 75 MG/1
75 CAPSULE ORAL 2 TIMES DAILY
Qty: 10 CAPSULE | Refills: 0 | Status: SHIPPED | OUTPATIENT
Start: 2024-02-14

## 2024-02-14 ASSESSMENT — ENCOUNTER SYMPTOMS
COUGH: 1
FEVER: 1
SORE THROAT: 1
CARDIOVASCULAR NEGATIVE: 1
EYES NEGATIVE: 1

## 2024-02-14 NOTE — PROGRESS NOTES
"Subjective:   Ambar Schulte is a 28 y.o. female who presents for Cough (Fever,sore throat,x1 day)      Cough  Associated symptoms include a fever and a sore throat.       Review of Systems   Constitutional:  Positive for fever and malaise/fatigue.   HENT:  Positive for congestion, nosebleeds and sore throat.    Eyes: Negative.    Respiratory:  Positive for cough.    Cardiovascular: Negative.        Medications, Allergies, and current problem list reviewed today in Epic.     Objective:     /64 (BP Location: Right arm, Patient Position: Sitting, BP Cuff Size: Adult long)   Pulse 96   Temp 37.7 °C (99.9 °F) (Temporal)   Resp 20   Ht 1.626 m (5' 4\")   Wt 92.5 kg (204 lb)   SpO2 95%     Physical Exam  Vitals and nursing note reviewed.   Constitutional:       Appearance: Normal appearance.   HENT:      Head: Normocephalic and atraumatic.      Right Ear: Tympanic membrane normal.      Left Ear: Tympanic membrane normal.      Nose: Congestion present.      Mouth/Throat:      Pharynx: Oropharynx is clear.   Cardiovascular:      Rate and Rhythm: Normal rate and regular rhythm.      Pulses: Normal pulses.      Heart sounds: Normal heart sounds.   Pulmonary:      Effort: Pulmonary effort is normal.      Breath sounds: Normal breath sounds.   Abdominal:      General: Abdomen is flat. Bowel sounds are normal.      Palpations: Abdomen is soft.   Neurological:      Mental Status: She is alert.         Assessment/Plan:     Diagnosis and associated orders:     1. Flu-like symptoms  POCT CEPHEID COV-2, FLU A/B, RSV - PCR      2. Influenza B  oseltamivir (TAMIFLU) 75 MG Cap         Comments/MDM:     Reports nasal bleed both nostrils when sneezed         Differential diagnosis, natural history, supportive care, and indications for immediate follow-up discussed.    Advised the patient to follow-up with the primary care physician for recheck, reevaluation, and consideration of further management.    Please note that this " dictation was created using voice recognition software. I have made a reasonable attempt to correct obvious errors, but I expect that there are errors of grammar and possibly content that I did not discover before finalizing the note.    This note was electronically signed by Parth Briscoe M.D.

## 2024-08-18 ENCOUNTER — OFFICE VISIT (OUTPATIENT)
Dept: URGENT CARE | Facility: CLINIC | Age: 29
End: 2024-08-18
Payer: COMMERCIAL

## 2024-08-18 VITALS
OXYGEN SATURATION: 98 % | RESPIRATION RATE: 12 BRPM | WEIGHT: 205.7 LBS | BODY MASS INDEX: 35.12 KG/M2 | HEART RATE: 70 BPM | SYSTOLIC BLOOD PRESSURE: 104 MMHG | HEIGHT: 64 IN | DIASTOLIC BLOOD PRESSURE: 78 MMHG | TEMPERATURE: 96.7 F

## 2024-08-18 DIAGNOSIS — K04.7 DENTAL INFECTION: ICD-10-CM

## 2024-08-18 PROCEDURE — 99213 OFFICE O/P EST LOW 20 MIN: CPT | Performed by: FAMILY MEDICINE

## 2024-08-18 PROCEDURE — 3078F DIAST BP <80 MM HG: CPT | Performed by: FAMILY MEDICINE

## 2024-08-18 PROCEDURE — 3074F SYST BP LT 130 MM HG: CPT | Performed by: FAMILY MEDICINE

## 2024-08-18 RX ORDER — ACETAMINOPHEN 500 MG
500-1000 TABLET ORAL EVERY 6 HOURS PRN
COMMUNITY

## 2024-08-18 RX ORDER — IBUPROFEN 200 MG
200 TABLET ORAL EVERY 6 HOURS PRN
COMMUNITY

## 2024-08-18 RX ORDER — CLINDAMYCIN HCL 300 MG
300 CAPSULE ORAL 3 TIMES DAILY
Qty: 21 CAPSULE | Refills: 0 | Status: SHIPPED | OUTPATIENT
Start: 2024-08-18 | End: 2024-08-25

## 2024-08-18 ASSESSMENT — ENCOUNTER SYMPTOMS: FEVER: 0

## 2024-08-18 NOTE — PROGRESS NOTES
"Subjective:     Ambar Schulte is a 29 y.o. female who presents for Oral Swelling ( Rt side of mouth/shooting pain/throbbing/ swelling  X3 days)    HPI  Pt presents for evaluation of an acute problem  Pt with pain in the right lower posterior molar pain   Pain just started a few days ago   No drainage or bleeding   Has some swelling in the right side of jaw this morning   Has some swelling of the gums in the area of pain    Review of Systems   Constitutional:  Negative for fever.   Skin:  Negative for rash.       PMH:  has no past medical history on file.  MEDS:   Current Outpatient Medications:     multivitamin Tab, Take 1 Tablet by mouth every day., Disp: , Rfl:     ibuprofen (MOTRIN) 200 MG Tab, Take 200 mg by mouth every 6 hours as needed., Disp: , Rfl:     acetaminophen (TYLENOL) 500 MG Tab, Take 500-1,000 mg by mouth every 6 hours as needed., Disp: , Rfl:     clindamycin (CLEOCIN) 300 MG Cap, Take 1 Capsule by mouth 3 times a day for 7 days., Disp: 21 Capsule, Rfl: 0  ALLERGIES:   Allergies   Allergen Reactions    Pcn [Penicillins]     Sulfa Drugs      SURGHX: No past surgical history on file.  SOCHX:  reports that she has never smoked. She has never used smokeless tobacco. She reports that she does not currently use alcohol after a past usage of about 1.2 oz of alcohol per week. She reports that she does not currently use drugs.     Objective:   /78   Pulse 70   Temp 35.9 °C (96.7 °F) (Temporal)   Resp 12   Ht 1.626 m (5' 4\")   Wt 93.3 kg (205 lb 11.2 oz)   SpO2 98%   BMI 35.31 kg/m²     Physical Exam  Constitutional:       General: She is not in acute distress.     Appearance: She is well-developed. She is not diaphoretic.   HENT:      Mouth/Throat:      Mouth: Mucous membranes are moist.      Pharynx: Oropharynx is clear. No oropharyngeal exudate.      Comments: Right posterior lower molar with moderate swelling of the gums, tenderness to palpation, and no active drainage or " bleeding  Pulmonary:      Effort: Pulmonary effort is normal.   Neurological:      Mental Status: She is alert.         Assessment/Plan:   Assessment    1. Dental infection  - clindamycin (CLEOCIN) 300 MG Cap; Take 1 Capsule by mouth 3 times a day for 7 days.  Dispense: 21 Capsule; Refill: 0    Other orders  - multivitamin Tab; Take 1 Tablet by mouth every day.  - ibuprofen (MOTRIN) 200 MG Tab; Take 200 mg by mouth every 6 hours as needed.  - acetaminophen (TYLENOL) 500 MG Tab; Take 500-1,000 mg by mouth every 6 hours as needed.    Patient with dental infection.  Allergic to penicillin and sulfa drug.  Will treat with clindamycin.  Reviewed other supportive care measures and emphasized importance of getting into a dentist.  Follow-up in urgent care as needed.

## 2024-08-18 NOTE — LETTER
August 18, 2024    To Whom It May Concern:        Dentists that can sometimes make quicker appointments         [facility]  [phone]      [facility]  [phone]      [facility]   [phone]        Dao Guerrero M.D.  [phone]

## 2024-10-02 ENCOUNTER — APPOINTMENT (OUTPATIENT)
Dept: RADIOLOGY | Facility: MEDICAL CENTER | Age: 29
End: 2024-10-02
Attending: STUDENT IN AN ORGANIZED HEALTH CARE EDUCATION/TRAINING PROGRAM
Payer: COMMERCIAL

## 2024-10-02 ENCOUNTER — HOSPITAL ENCOUNTER (EMERGENCY)
Facility: MEDICAL CENTER | Age: 29
End: 2024-10-02
Attending: STUDENT IN AN ORGANIZED HEALTH CARE EDUCATION/TRAINING PROGRAM
Payer: COMMERCIAL

## 2024-10-02 VITALS
TEMPERATURE: 97.1 F | HEIGHT: 64 IN | RESPIRATION RATE: 16 BRPM | OXYGEN SATURATION: 96 % | WEIGHT: 205 LBS | HEART RATE: 73 BPM | DIASTOLIC BLOOD PRESSURE: 73 MMHG | SYSTOLIC BLOOD PRESSURE: 144 MMHG | BODY MASS INDEX: 35 KG/M2

## 2024-10-02 DIAGNOSIS — Z23 NEED FOR TDAP VACCINATION: ICD-10-CM

## 2024-10-02 DIAGNOSIS — S61.432A PUNCTURE WOUND OF LEFT PALM, INITIAL ENCOUNTER: ICD-10-CM

## 2024-10-02 PROCEDURE — A9270 NON-COVERED ITEM OR SERVICE: HCPCS | Performed by: STUDENT IN AN ORGANIZED HEALTH CARE EDUCATION/TRAINING PROGRAM

## 2024-10-02 PROCEDURE — 90471 IMMUNIZATION ADMIN: CPT

## 2024-10-02 PROCEDURE — 99283 EMERGENCY DEPT VISIT LOW MDM: CPT

## 2024-10-02 PROCEDURE — 700111 HCHG RX REV CODE 636 W/ 250 OVERRIDE (IP): Performed by: STUDENT IN AN ORGANIZED HEALTH CARE EDUCATION/TRAINING PROGRAM

## 2024-10-02 PROCEDURE — 73120 X-RAY EXAM OF HAND: CPT | Mod: LT

## 2024-10-02 PROCEDURE — 90715 TDAP VACCINE 7 YRS/> IM: CPT | Performed by: STUDENT IN AN ORGANIZED HEALTH CARE EDUCATION/TRAINING PROGRAM

## 2024-10-02 PROCEDURE — 700102 HCHG RX REV CODE 250 W/ 637 OVERRIDE(OP): Performed by: STUDENT IN AN ORGANIZED HEALTH CARE EDUCATION/TRAINING PROGRAM

## 2024-10-02 RX ORDER — IBUPROFEN 600 MG/1
600 TABLET, FILM COATED ORAL ONCE
Status: COMPLETED | OUTPATIENT
Start: 2024-10-02 | End: 2024-10-02

## 2024-10-02 RX ADMIN — CLOSTRIDIUM TETANI TOXOID ANTIGEN (FORMALDEHYDE INACTIVATED), CORYNEBACTERIUM DIPHTHERIAE TOXOID ANTIGEN (FORMALDEHYDE INACTIVATED), BORDETELLA PERTUSSIS TOXOID ANTIGEN (GLUTARALDEHYDE INACTIVATED), BORDETELLA PERTUSSIS FILAMENTOUS HEMAGGLUTININ ANTIGEN (FORMALDEHYDE INACTIVATED), BORDETELLA PERTUSSIS PERTACTIN ANTIGEN, AND BORDETELLA PERTUSSIS FIMBRIAE 2/3 ANTIGEN 0.5 ML: 5; 2; 2.5; 5; 3; 5 INJECTION, SUSPENSION INTRAMUSCULAR at 22:32

## 2024-10-02 RX ADMIN — IBUPROFEN 600 MG: 600 TABLET, FILM COATED ORAL at 22:31

## 2024-10-03 ENCOUNTER — PHARMACY VISIT (OUTPATIENT)
Dept: PHARMACY | Facility: MEDICAL CENTER | Age: 29
End: 2024-10-03
Payer: COMMERCIAL

## 2024-10-03 PROCEDURE — RXOTC WILLOW AMBULATORY OTC CHARGE: Performed by: PHARMACIST
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
